# Patient Record
Sex: MALE | Race: WHITE
[De-identification: names, ages, dates, MRNs, and addresses within clinical notes are randomized per-mention and may not be internally consistent; named-entity substitution may affect disease eponyms.]

---

## 2017-02-11 ENCOUNTER — HOSPITAL ENCOUNTER (OUTPATIENT)
Dept: HOSPITAL 45 - C.LAB | Age: 52
Discharge: HOME | End: 2017-02-11
Attending: INTERNAL MEDICINE
Payer: COMMERCIAL

## 2017-02-11 DIAGNOSIS — E78.5: ICD-10-CM

## 2017-02-11 DIAGNOSIS — I10: Primary | ICD-10-CM

## 2017-02-11 DIAGNOSIS — E11.9: ICD-10-CM

## 2017-02-11 LAB
ALBUMIN/GLOB SERPL: 0.9 {RATIO} (ref 0.9–2)
ALP SERPL-CCNC: 61 U/L (ref 45–117)
ALT SERPL-CCNC: 23 U/L (ref 12–78)
ANION GAP SERPL CALC-SCNC: 10 MMOL/L (ref 3–11)
AST SERPL-CCNC: 19 U/L (ref 15–37)
BUN SERPL-MCNC: 16 MG/DL (ref 7–18)
BUN/CREAT SERPL: 14.7 (ref 10–20)
CALCIUM SERPL-MCNC: 9.4 MG/DL (ref 8.5–10.1)
CHLORIDE SERPL-SCNC: 105 MMOL/L (ref 98–107)
CHOLEST/HDLC SERPL: 3.7 {RATIO}
CO2 SERPL-SCNC: 25 MMOL/L (ref 21–32)
CREAT SERPL-MCNC: 1.1 MG/DL (ref 0.6–1.4)
EST. AVERAGE GLUCOSE BLD GHB EST-MCNC: 166 MG/DL
GLOBULIN SER-MCNC: 4.2 GM/DL (ref 2.5–4)
GLUCOSE SERPL-MCNC: 153 MG/DL (ref 70–99)
GLUCOSE UR QL: 53 MG/DL
KETONES UR QL STRIP: 118 MG/DL
NITRITE UR QL STRIP: 134 MG/DL (ref 0–150)
PH UR: 198 MG/DL (ref 0–200)
POTASSIUM SERPL-SCNC: 4.3 MMOL/L (ref 3.5–5.1)
SODIUM SERPL-SCNC: 140 MMOL/L (ref 136–145)
TSH SERPL-ACNC: 1.77 UIU/ML (ref 0.3–4.5)
VERY LOW DENSITY LIPOPROT CALC: 27 MG/DL

## 2017-03-05 ENCOUNTER — HOSPITAL ENCOUNTER (INPATIENT)
Dept: HOSPITAL 45 - C.EDB | Age: 52
LOS: 1 days | Discharge: HOME | DRG: 299 | End: 2017-03-06
Attending: INTERNAL MEDICINE | Admitting: HOSPITALIST
Payer: COMMERCIAL

## 2017-03-05 VITALS
BODY MASS INDEX: 30.43 KG/M2 | WEIGHT: 205.47 LBS | HEIGHT: 69 IN | HEIGHT: 69 IN | WEIGHT: 205.47 LBS | BODY MASS INDEX: 30.43 KG/M2

## 2017-03-05 DIAGNOSIS — E78.5: ICD-10-CM

## 2017-03-05 DIAGNOSIS — I48.91: ICD-10-CM

## 2017-03-05 DIAGNOSIS — I82.441: ICD-10-CM

## 2017-03-05 DIAGNOSIS — N17.9: ICD-10-CM

## 2017-03-05 DIAGNOSIS — I82.431: ICD-10-CM

## 2017-03-05 DIAGNOSIS — I26.99: ICD-10-CM

## 2017-03-05 DIAGNOSIS — I82.411: Primary | ICD-10-CM

## 2017-03-05 DIAGNOSIS — I10: ICD-10-CM

## 2017-03-05 DIAGNOSIS — F32.9: ICD-10-CM

## 2017-03-05 DIAGNOSIS — I82.811: ICD-10-CM

## 2017-03-05 DIAGNOSIS — Z79.01: ICD-10-CM

## 2017-03-05 DIAGNOSIS — Z90.81: ICD-10-CM

## 2017-03-05 DIAGNOSIS — Z94.1: ICD-10-CM

## 2017-03-05 DIAGNOSIS — I82.491: ICD-10-CM

## 2017-03-05 LAB
ALBUMIN/GLOB SERPL: 0.9 {RATIO} (ref 0.9–2)
ALP SERPL-CCNC: 76 U/L (ref 45–117)
ALT SERPL-CCNC: 25 U/L (ref 12–78)
ANION GAP SERPL CALC-SCNC: 12 MMOL/L (ref 3–11)
ANION GAP SERPL CALC-SCNC: 14 MMOL/L (ref 16–25)
AST SERPL-CCNC: 20 U/L (ref 15–37)
BASOPHILS # BLD: 0.04 K/UL (ref 0–0.2)
BASOPHILS NFR BLD: 0.3 %
BUN SERPL-MCNC: 32 MG/DL (ref 7–18)
BUN/CREAT SERPL: 14.3 (ref 10–20)
CA-I BLD-SCNC: 1.22 MMOL/L (ref 1.12–1.32)
CALCIUM SERPL-MCNC: 9.1 MG/DL (ref 8.5–10.1)
CHLORIDE BLD-SCNC: 103 MEQ/L (ref 101–112)
CHLORIDE SERPL-SCNC: 104 MMOL/L (ref 98–107)
CO2 SERPL-SCNC: 25 MMOL/L (ref 21–32)
COMPLETE: YES
CREAT BLD-MCNC: 1.9 MG/DL (ref 0.6–1.3)
CREAT CL PREDICTED SERPL C-G-VRATE: 44.3 ML/MIN
CREAT SERPL-MCNC: 2.2 MG/DL (ref 0.6–1.4)
EOSINOPHIL NFR BLD AUTO: 266 K/UL (ref 130–400)
GLOBULIN SER-MCNC: 4.1 GM/DL (ref 2.5–4)
GLUCOSE SERPL-MCNC: 155 MG/DL (ref 70–99)
HCT VFR BLD AUTO: 49 % (ref 42–52)
HCT VFR BLD CALC: 47.2 % (ref 42–52)
HGB BLD-MCNC: 16.7 G/DL (ref 14–18)
IG%: 0.2 %
IMM GRANULOCYTES NFR BLD AUTO: 35.4 %
INR PPP: 1 (ref 0.9–1.1)
ISTAT CARBON DIOXIDE: 30 MEQ/L (ref 24–31)
LYMPHOCYTES # BLD: 4.6 K/UL (ref 1.2–3.4)
MCH RBC QN AUTO: 31.4 PG (ref 25–34)
MCHC RBC AUTO-ENTMCNC: 33.7 G/DL (ref 32–36)
MCV RBC AUTO: 93.3 FL (ref 80–100)
MONOCYTES NFR BLD: 12.5 %
NEUTROPHILS # BLD AUTO: 3.3 %
NEUTROPHILS NFR BLD AUTO: 48.3 %
NRBC BLD AUTO-RTO: 0.7 %
PARTIAL THROMBOPLASTIN RATIO: 1
PMV BLD AUTO: 10.2 FL (ref 7.4–10.4)
POTASSIUM SERPL-SCNC: 4.1 MMOL/L (ref 3.5–5.1)
PROTHROMBIN TIME: 10.8 SECONDS (ref 9–12)
RBC # BLD AUTO: 5.06 M/UL (ref 4.7–6.1)
SODIUM BLD-SCNC: 142 MEQ/L (ref 135–144)
SODIUM SERPL-SCNC: 141 MMOL/L (ref 136–145)
WBC # BLD AUTO: 12.98 K/UL (ref 4.8–10.8)

## 2017-03-05 NOTE — HISTORY AND PHYSICAL
History & Physical


Date & Time of Service:


Mar 5, 2017 at 23:30


Chief Complaint:


Rt Leg Stiffness W/Pain,Hx Bloodclot


Primary Care Physician:


Kp Contreras M.D.


History of Present Illness


Source:  patient, clinic records, hospital records


Mr Banerjee is a 51 year old male with history of a heart transplant who presents 

with four days of right leg pain and tightness. He was concerned for a DVT as 

it felt like his previous DVT 9 months previously. His last DVT was possibly 

provoked by 8 hour car trips around the country. He was treated with Lovenox 

transitioned to warfarin and reports being well controlled on this. He had a 

follow up DVT scan which showed recanalized veins in December 2016. He reports 

no shortness of breath or chest pain with either this DVT or his last.





Recently he has had no provoking surgeries or long trips. Although he drives 

once/month to [place] (3 hours down in morning and 3 hours back at night 

without stopping). He works as a  and mostly sedentary at 

the desk during the day.





His heart history is documented below in [facility]. He follows up once a year in 

[place] - Dr Frausto. His PCP is Dr Contreras. He reports no previous kidney 

issues. He has been eating and drinking well over the last few days. Denies any 

urinary Sx.





Past Medical/Surgical History


Congenital Heart Disease - Ebstein Anomaly, congenitally corrected 

transposition of the great vessels


Aortic valve replacement - 1979


Bacterial endocarditis - 1984, causing ARDS and requiring ECMO and tracheostomy


Redo aortic valve replacement


Staph septicemia + splenectomy - 1990


Peptic ulcer disease - 1990


Phlebitis of left cephalic vein - venectomy - 1990


Atrial fibrillation and complete heart block - 


Multiple pacemaker revisions


Nasal septal deviation


Sinusitis


Heart transplant - 2004 with complication of acute renal failure requiring 

dialysis and ultrafiltration, post operative ileus. One previous episode of 

rejection treated with oral steroid burst


Hypertension


Hyperlipidemia


Right leg DVT - 9 months previous treated withlovenox then warfarin 


Short term memory loss


Depression





Family History





No significant family history





Social History


Smoking Status:  Never Smoker


Marital Status:  


Occupational Status:  employed





Multi-Drug Resistant Organisms


History of MDRO:  No





Allergies


Coded Allergies:  


     Heparin (Verified  Allergy, Unknown, 1/16/15)


     Pork (Verified  Allergy, Unknown, 3/5/17)


     ACE Inhibitors (Unverified  Adverse Reaction, Unknown, HIVES, 3/5/17)


 Caused Cough


     Amoxicillin (Unverified  Adverse Reaction, Unknown, RASH, 3/5/17)


 caused nausea


     Clavulanic Acid (Unverified  Adverse Reaction, Unknown, RASH, 3/5/17)


 caused nausea


     Quinidine (Unverified  Adverse Reaction, Unknown, RASH, 3/5/17)


 Caused Nausea





Home Medications


Scheduled


Alfuzosin Hcl (Uroxatral), 10 MG PO DAILY


Aspirin (Aspirin Ec), 81 MG PO DAILY


Bupropion (Wellbutrin-Xl), 300 MG PO DAILY


Buspirone Hcl (Buspar), 15 MG PO BID


Calcium Carbonate-Cholecalcife (Calcium/Vitamin D 500-200 mg-Unit), 1 TAB PO 

DAILY


Esomeprazole Magnesium (Nexium), 40 MG PO DAILY


Losartan Potassium (Cozaar), 100 MG PO DAILY


Methylphenidate Hcl (Concerta), 54 MG PO QAM


Multivitamin (Multivitamin), 1 TAB PO DAILY


Mycophenolate Mofetil (Mycophenolate Mofetil), 750 MG PO BID


Simvastatin (Zocor), 20 MG PO QPM


Tacrolimus (Prograf), 1 MG PO BID





Scheduled PRN


Alprazolam (Xanax), 1 TAB PO DAILY PRN for Sleep





Review of Systems


Constitutional:  No chills, No fever, No sweats, No weight loss


Eyes:  No worsening of vision


ENT:  No hearing loss, No nasal symptoms, No unusual epistaxis


Respiratory:  No cough, No dyspnea at rest, No dyspnea on exertion, No 

hemoptysis, No shortness of breath, No sputum, No wheezing


Cardiovascular:  No PND, No chest pain, No claudication, No edema, No orthopnea

, No palpitations


Abdomen:  No GI bleeding, No constipation, No diarrhea, No nausea, No pain, No 

vomiting


Musculoskeletal:  No joint pain


Genitourinary - Male:  No dysuria, No hematuria, No urinary frequency


Psychiatric:  No anxiety, No depression symptoms


Endocrine:  No excessive thirst, No excessive urination, No fatigue


Hematologic / Lymphatic:  No abnormal bleeding/bruising


Integumentary:  No itch, No rash





Physical Exam


Vital Signs











  Date Time  Temp Pulse Resp B/P Pulse Ox O2 Delivery O2 Flow Rate FiO2


 


3/5/17 23:29  94      


 


3/5/17 22:45  83 18 206/128 95 Room Air  


 


3/5/17 22:08  97 16 151/104 95   


 


3/5/17 22:07     98 Room Air  


 


3/5/17 20:05 36.6 104 20 131/95 96 Room Air  








General Appearance:  WD/WN, no apparent distress


Head:  normocephalic, atraumatic


Eyes:  normal inspection, PERRL, EOMI


ENT:  normal ENT inspection


Neck:  supple, no JVD


Respiratory/Chest:  chest non-tender, lungs clear, normal breath sounds, no 

respiratory distress, no accessory muscle use


Cardiovascular:  regular rate, rhythm, normal peripheral pulses, + systolic 

murmur (soft LUSB)


Abdomen/GI:  normal bowel sounds, non tender, soft


Back:  no CVA tenderness


Extremities/Musculoskelatal:  no pedal edema, + pertinent finding (tender 

swollen right calf)


Neurologic/Psych:  CNs II-XII nml as tested, no motor/sensory deficits, alert, 

oriented x 3


Skin:  normal color, warm/dry, no rash





Diagnostics


Laboratory Results





Results Past 24 Hours








Test


  3/5/17


21:58 3/5/17


22:03 Range/Units


 


 


White Blood Count 12.98  4.8-10.8  K/uL


 


Red Blood Count 5.06  4.7-6.1  M/uL


 


Hemoglobin 15.9  14.0-18.0  g/dL


 


Hematocrit 47.2  42-52  %


 


Mean Corpuscular Volume 93.3    fL


 


Mean Corpuscular Hemoglobin 31.4  25-34  pg


 


Mean Corpuscular Hemoglobin


Concent 33.7


  


  32-36  g/dl


 


 


Platelet Count 266  130-400  K/uL


 


Mean Platelet Volume 10.2  7.4-10.4  fL


 


Neutrophils (%) (Auto) 48.3   %


 


Lymphocytes (%) (Auto) 35.4   %


 


Monocytes (%) (Auto) 12.5   %


 


Eosinophils (%) (Auto) 3.3   %


 


Basophils (%) (Auto) 0.3   %


 


Neutrophils # (Auto) 6.26  1.4-6.5  K/uL


 


Lymphocytes # (Auto) 4.60  1.2-3.4  K/uL


 


Monocytes # (Auto) 1.62  0.11-0.59  K/uL


 


Eosinophils # (Auto) 0.43  0-0.5  K/uL


 


Basophils # (Auto) 0.04  0-0.2  K/uL


 


RDW Standard Deviation 44.2  36.4-46.3  fL


 


RDW Coefficient of Variation 13.0  11.5-14.5  %


 


Immature Granulocyte % (Auto) 0.2   %


 


Immature Granulocyte # (Auto) 0.03  0.00-0.02  K/uL


 


Nucleated RBC Absolute Count


(auto) 0.10


  


  0-0  K/uL


 


 


Nucleated Red Blood Cells % 0.7   %


 


Prothrombin Time


  10.8


  


  9.0-12.0


SECONDS


 


Prothromb Time International


Ratio 1.0


  


  0.9-1.1  


 


 


Activated Partial


Thromboplast Time 26.6


  


  21.0-31.0


SECONDS


 


Partial Thromboplastin Ratio 1.0   


 


Sodium Level 141  136-145  mmol/L


 


Potassium Level 4.1  3.5-5.1  mmol/L


 


Chloride Level 104    mmol/L


 


Carbon Dioxide Level 25  21-32  mmol/L


 


Anion Gap 12.0 14.0 16-25  mmol/L


 


Blood Urea Nitrogen 32  7-18  mg/dl


 


Creatinine


  2.20


  


  0.60-1.40


mg/dl


 


Est Creatinine Clear Calc


Drug Dose 44.3


  


   ml/min


 


 


Estimated GFR (


American) 38.5


  


   


 


 


Estimated GFR (Non-


American 33.2


  


   


 


 


BUN/Creatinine Ratio 14.3  10-20  


 


Random Glucose 155  70-99  mg/dl


 


Calcium Level 9.1  8.5-10.1  mg/dl


 


Total Bilirubin 0.7  0.2-1  mg/dl


 


Aspartate Amino Transf


(AST/SGOT) 20


  


  15-37  U/L


 


 


Alanine Aminotransferase


(ALT/SGPT) 25


  


  12-78  U/L


 


 


Alkaline Phosphatase 76    U/L


 


Total Protein 7.8  6.4-8.2  gm/dl


 


Albumin 3.7  3.4-5.0  gm/dl


 


Globulin 4.1  2.5-4.0  gm/dl


 


Albumin/Globulin Ratio 0.9  0.9-2  


 


Chemistry Specimen Hemolysis    


 


Bedside Hemoglobin  16.7 14.0-18.0  g/dl


 


Bedside Hematocrit  49 42-52  %


 


Bedside Sodium  142 135-144  mEq/L


 


Bedside Potassium  4.1 3.3-5.0  mEq/L


 


Bedside Chloride  103 101-112  mEq/L


 


Bedside Total CO2  30 24-31  mEq/l


 


Bedside Blood Urea Nitrogen  33 7-18  mg/dl


 


Bedside Creatinine  1.9 0.6-1.3  mg/dl


 


Bedside Glucose (other)  158 70-99  mg/dl


 


Bedside Ionized Calcium (Natalie)


  


  1.22


  1.12-1.32


mmol/l











Diagnostic Radiology


CHEST CTA for PULMONARY ARTERIES





CT DOSE: 523.15 mGy.cm





HISTORY: Chest pain  dyspnea





TECHNIQUE: Multiaxial CT images of the chest were performed following the


intravenous administration of contrast to evaluate the pulmonary arteries.


Maximal intensity projection images were also obtained.





COMPARISON STUDY: None.





FINDINGS: Atherosclerotic change thoracic aorta.





Median sternotomy.





There is a filling defect of the third order vessel right lower lobe


distribution transaxial image 1 hour 9. There is no evidence for main or central


pulmonary embolus. All remaining pulmonary arterial vessels fill appropriately.


There is slight interstitial and interstitial prominence bilaterally. There are


no well-defined focal infiltrates.





IMPRESSION: 





1. Small third order filling defect or pulmonary embolus right lower lobe


2. No evidence for main or central pulmonary embolus.


3. Slight interstitial prominence throughout both hemithoraces 





Electronically signed by:  Arthur Gamboa M.D.


3/5/2017 10:38 PM





Dictated Date/Time:  3/5/2017 10:34 PM











Venous Doppler right leg RIGHT VENOUS DOPP LOWER EXT UNILAT





CLINICAL HISTORY: Pt c/o RLE swelling


Right pain. Edema.





TECHNIQUE: Venous Doppler





COMPARISON STUDY:  None





FINDINGS: Extensive acute deep venous thrombosis throughout the right leg.


Thrombus is identified within the common as well as superficial femoral veins.


There is also involvement of the popliteal vein and distal vessels including


posterior tibial peroneal and anterior tibial veins. Thrombus formation is also


identified within several superficial venous structures the calf





IMPRESSION:  extensive acute deep venous thrombosis throughout the right leg. 





Electronically signed by:  Arthur Gamboa M.D.


3/5/2017 9:36 PM





Dictated Date/Time:  3/5/2017 9:34 PM





Impression


Assessment and Plan


50 yo male heart transplant patient with 2nd DVT and acute kidney injury.





CHICHI - Cr 2.2, 1.1 @ baseline


- ER discussed with Dr Chi (Nephrology), for gentle hydration 


- IVF  MLS/HR, no sign of heart failure on CT


- given contrast for CTPA therefore may get worse overnight


- add CK to labs as possible explanation given DVT


- tacrolimus could also be the cause and will keep [facility] transplant team 

informed of admission. Level taken.





DVT / possible small PE


- will treat with Lovenox. Reduced dose to daily 90mg (1mg/kg) due to renal 

function, however if improving by the morning we will increase this to Q12H.


- further treatment pending clinical course but given INR reportedly stable 

previously this remains a good option for long term anticoagulation





Heart transplant


- continue mycophenolate and tacrolimus - patient to take own medications. 

Tacrolimus levels taken.


- Contact [place] transplant team to inform of admission ()





HTN


- Continue losartan 100 mg daily





Depression/anxiety


- Continue Wellbutrin + BuSpar. Alprazolam PRN





Code


- Full





VTE Prophylaxis


- On treatment dose Lovenox for DVT/PE as above





Disposition


- will admit to telemetry given multiple medical issues with heart transplant, 

acute kidney injury and DVT/PE.











Resident Physician Supervision Note:


Pt examined independently. I discussed the case with the resident and agree 

with the findings and plan as documented in the note.  Any exceptions or 

clarifications are listed here.





52 y/o M w/Hx heart transplant 13 yrs prior - treated for DVT for 6 month 

period 8 months ago - presents with LE pain and swelling - has extensive DVT 

and small PE.  Also noted to have renal impairment which was not previously 

present.





O/E 


AAO x 3 


S1,2 R


CTAB


NT, ND





P:


DVT - will likely need to commit to lifelong anticoagulation - he received a 

dose of Lovenox but may need Heparin if renal function worsens and will need to 

be transitioned to Coumadin.  Would defer to [facility] transplant center to 

determine if he would be eligible for alternative anticoagulation.





CHICHI - recently normal creat - will obtain urine sodiium and creat - provide IVF 

and check BMP - pt does not have any reason to suspect he is dehydrated - 

denies decreased intake - it is also noted that he received contrast in the ER 

which will hopefully be offset by IVF





Transplant - Cont Mycophenolate and Tacro - will check levels due to his renal 

impairment

















Documented By:  Prashant Cespedes





Level of Care


Telemetry





Resuscitation Status


FULL RESUSCITATION





VTE Prophylaxis


VTE Risk Assessment Done? Y/N:  Yes


Risk Level:  High


Given or contraindicated:  Enoxaparin (Lovenox)SQ (treatment dose)





Additional Copies To


Len Smith M.D.; Kp Contreras M.D.

## 2017-03-05 NOTE — DIAGNOSTIC IMAGING REPORT
Venous Doppler right leg RIGHT VENOUS DOPP LOWER EXT UNILAT



CLINICAL HISTORY: Pt c/o RLE swelling

Right pain. Edema.



TECHNIQUE: Venous Doppler



COMPARISON STUDY:  None



FINDINGS: Extensive acute deep venous thrombosis throughout the right leg.

Thrombus is identified within the common as well as superficial femoral veins.

There is also involvement of the popliteal vein and distal vessels including

posterior tibial peroneal and anterior tibial veins. Thrombus formation is also

identified within several superficial venous structures the calf



IMPRESSION:  extensive acute deep venous thrombosis throughout the right leg. 









Electronically signed by:  Arthur Gamboa M.D.

3/5/2017 9:36 PM



Dictated Date/Time:  3/5/2017 9:34 PM

## 2017-03-05 NOTE — DIAGNOSTIC IMAGING REPORT
CHEST CTA for PULMONARY ARTERIES



CT DOSE: 523.15 mGy.cm



HISTORY: Chest pain  dyspnea



TECHNIQUE: Multiaxial CT images of the chest were performed following the

intravenous administration of contrast to evaluate the pulmonary arteries.

Maximal intensity projection images were also obtained.



COMPARISON STUDY: None.



FINDINGS: Atherosclerotic change thoracic aorta.



Median sternotomy.



There is a filling defect of the third order vessel right lower lobe

distribution transaxial image 1 hour 9. There is no evidence for main or central

pulmonary embolus. All remaining pulmonary arterial vessels fill appropriately.

There is slight interstitial and interstitial prominence bilaterally. There are

no well-defined focal infiltrates.



IMPRESSION: 



1. Small third order filling defect or pulmonary embolus right lower lobe

2. No evidence for main or central pulmonary embolus.

3. Slight interstitial prominence throughout both hemithoraces 







Electronically signed by:  Arthur Gamboa M.D.

3/5/2017 10:38 PM



Dictated Date/Time:  3/5/2017 10:34 PM

## 2017-03-05 NOTE — EMERGENCY ROOM VISIT NOTE
History


Report prepared by Val:  Jocy Vallecillo


Under the Supervision of:  Dr. Oscar Cuellar M.D.


First contact with patient:  20:16


Chief Complaint:  LEG PAIN,LEG INJURY


Stated Complaint:  RT LEG STIFFNESS W/PAIN,HX BLOODCLOT





History of Present Illness


The patient is a 51 year old male who presents to the Emergency Room with 

complaints of worsening right calf pain for the past week. He states that his 

pain started subtly and initially felt like he strained a muscle. Over the past 

week his pain has worsened and he describes a tight feeling in the right calf. 

The patient rates his current pain as a 3/10. He has a history of DVT and had 

one 9 months ago. He was on Lovenox and Warfarin for his previous DVT, but he 

is no longer taking any blood thinners. He states that his current symptoms 

feel exactly like his previous DVT. He recently drove to [place], but denies 

any other long trips.





   Source of History:  patient


   Onset:  1 week ago


   Position:  leg (right)


   Symptom Intensity:  3/10


   Quality:  other (tightness)


   Timing:  worsening





Review of Systems


See HPI for pertinent positives & negatives. A total of 10 systems reviewed and 

were otherwise negative.





Past Medical & Surgical


Surgical Problems:


(1) Heart transplanted








Family History





No significant family history





Social History


Smoking Status:  Never Smoker


Marital Status:  


Housing Status:  lives with family


Occupation Status:  employed





Current/Historical Medications


Scheduled


Alfuzosin Hcl (Uroxatral), 10 MG PO DAILY


Aspirin (Aspirin Ec), 81 MG PO DAILY


Bupropion (Wellbutrin-Xl), 300 MG PO DAILY


Buspirone Hcl (Buspar), 15 MG PO BID


Calcium Carbonate-Cholecalcife (Calcium/Vitamin D 500-200 mg-Unit), 1 TAB PO 

DAILY


Esomeprazole Magnesium (Nexium), 40 MG PO DAILY


Losartan Potassium (Cozaar), 100 MG PO DAILY


Methylphenidate Hcl (Concerta), 54 MG PO QAM


Multivitamin (Multivitamin), 1 TAB PO DAILY


Mycophenolate Mofetil (Mycophenolate Mofetil), 750 MG PO BID


Simvastatin (Zocor), 20 MG PO QPM


Tacrolimus (Prograf), 1 MG PO BID





Scheduled PRN


Alprazolam (Xanax), 1 TAB PO DAILY PRN for Sleep





Allergies


Coded Allergies:  


     Heparin (Verified  Allergy, Unknown, 1/16/15)


     Pork (Verified  Allergy, Unknown, 3/5/17)


     ACE Inhibitors (Unverified  Adverse Reaction, Unknown, HIVES, 3/5/17)


 Caused Cough


     Amoxicillin (Unverified  Adverse Reaction, Unknown, RASH, 3/5/17)


 caused nausea


     Clavulanic Acid (Unverified  Adverse Reaction, Unknown, RASH, 3/5/17)


 caused nausea


     Quinidine (Unverified  Adverse Reaction, Unknown, RASH, 3/5/17)


 Caused Nausea





Physical Exam


Vital Signs











  Date Time  Temp Pulse Resp B/P Pulse Ox O2 Delivery O2 Flow Rate FiO2


 


3/5/17 23:29  94      


 


3/5/17 22:45  83 18 206/128 95 Room Air  


 


3/5/17 22:08  97 16 151/104 95   


 


3/5/17 22:07     98 Room Air  


 


3/5/17 20:05 36.6 104 20 131/95 96 Room Air  











Physical Exam


GENERAL: Patient is a healthy-appearing well-nourished 51 year old male.


HEAD: Normocephalic atraumatic


EYES: Ocular movements intact pupils equal and react to light


OROPHARYNX mucous membranes are moist no exudates present no erythema or edema 

present


NECK: Supple no nuchal rigidity


CHEST: Good equal expansion


LUNGS: Clear and equal to auscultation


CARDIAC: Normal S1 and S2


ABDOMEN: Soft nontender no guarding


BACK: No CVA tenderness


EXTREMITIES: No pain upon palpation normal muscle strength in all groups no 

clubbing cyanosis or edema. Right calf is slightly tender, no evidence of 

cellulitis, good ROM of the ankle knee and hip. 


NEURO: Patient is following commands is answering questions appropriately. 

Alert and oriented x3 Cranial Nerves 2-12 grossly intact





Medical Decision & Procedures


ER Provider


Diagnostic Interpretation:


Radiology results as stated below per my review and radiologist interpretation:








Venous Doppler right leg RIGHT VENOUS DOPP LOWER EXT UNILAT





CLINICAL HISTORY: Pt c/o RLE swelling


Right pain. Edema.





TECHNIQUE: Venous Doppler





COMPARISON STUDY:  None





FINDINGS: Extensive acute deep venous thrombosis throughout the right leg.


Thrombus is identified within the common as well as superficial femoral veins.


There is also involvement of the popliteal vein and distal vessels including


posterior tibial peroneal and anterior tibial veins. Thrombus formation is also


identified within several superficial venous structures the calf





IMPRESSION:  extensive acute deep venous thrombosis throughout the right leg. 














Electronically signed by:  Arthur Gmaboa M.D.


3/5/2017 9:36 PM





Dictated Date/Time:  3/5/2017 9:34 PM














CHEST CTA for PULMONARY ARTERIES





CT DOSE: 523.15 mGy.cm





HISTORY: Chest pain  dyspnea





TECHNIQUE: Multiaxial CT images of the chest were performed following the


intravenous administration of contrast to evaluate the pulmonary arteries.


Maximal intensity projection images were also obtained.





COMPARISON STUDY: None.





FINDINGS: Atherosclerotic change thoracic aorta.





Median sternotomy.





There is a filling defect of the third order vessel right lower lobe


distribution transaxial image 1 hour 9. There is no evidence for main or central


pulmonary embolus. All remaining pulmonary arterial vessels fill appropriately.


There is slight interstitial and interstitial prominence bilaterally. There are


no well-defined focal infiltrates.





IMPRESSION: 





1. Small third order filling defect or pulmonary embolus right lower lobe


2. No evidence for main or central pulmonary embolus.


3. Slight interstitial prominence throughout both hemithoraces 











Electronically signed by:  Arthur Gamboa M.D.


3/5/2017 10:38 PM





Dictated Date/Time:  3/5/2017 10:34 PM





Laboratory Results


3/5/17 21:58








Red Blood Count 5.06, Mean Corpuscular Volume 93.3, Mean Corpuscular Hemoglobin 

31.4, Mean Corpuscular Hemoglobin Concent 33.7, Mean Platelet Volume 10.2, 

Neutrophils (%) (Auto) 48.3, Lymphocytes (%) (Auto) 35.4, Monocytes (%) (Auto) 

12.5, Eosinophils (%) (Auto) 3.3, Basophils (%) (Auto) 0.3, Neutrophils # (Auto

) 6.26, Lymphocytes # (Auto) 4.60, Monocytes # (Auto) 1.62, Eosinophils # (Auto

) 0.43, Basophils # (Auto) 0.04





3/5/17 21:58

















Test


  3/5/17


21:58 3/5/17


22:03


 


White Blood Count


  12.98 K/uL


(4.8-10.8) 


 


 


Red Blood Count


  5.06 M/uL


(4.7-6.1) 


 


 


Hemoglobin


  15.9 g/dL


(14.0-18.0) 


 


 


Hematocrit 47.2 % (42-52)  


 


Mean Corpuscular Volume


  93.3 fL


() 


 


 


Mean Corpuscular Hemoglobin


  31.4 pg


(25-34) 


 


 


Mean Corpuscular Hemoglobin


Concent 33.7 g/dl


(32-36) 


 


 


Platelet Count


  266 K/uL


(130-400) 


 


 


Mean Platelet Volume


  10.2 fL


(7.4-10.4) 


 


 


Neutrophils (%) (Auto) 48.3 %  


 


Lymphocytes (%) (Auto) 35.4 %  


 


Monocytes (%) (Auto) 12.5 %  


 


Eosinophils (%) (Auto) 3.3 %  


 


Basophils (%) (Auto) 0.3 %  


 


Neutrophils # (Auto)


  6.26 K/uL


(1.4-6.5) 


 


 


Lymphocytes # (Auto)


  4.60 K/uL


(1.2-3.4) 


 


 


Monocytes # (Auto)


  1.62 K/uL


(0.11-0.59) 


 


 


Eosinophils # (Auto)


  0.43 K/uL


(0-0.5) 


 


 


Basophils # (Auto)


  0.04 K/uL


(0-0.2) 


 


 


RDW Standard Deviation


  44.2 fL


(36.4-46.3) 


 


 


RDW Coefficient of Variation


  13.0 %


(11.5-14.5) 


 


 


Immature Granulocyte % (Auto) 0.2 %  


 


Immature Granulocyte # (Auto)


  0.03 K/uL


(0.00-0.02) 


 


 


Nucleated RBC Absolute Count


(auto) 0.10 K/uL


(0-0) 


 


 


Nucleated Red Blood Cells % 0.7 %  


 


Prothrombin Time


  10.8 SECONDS


(9.0-12.0) 


 


 


Prothromb Time International


Ratio 1.0 (0.9-1.1) 


  


 


 


Activated Partial


Thromboplast Time 26.6 SECONDS


(21.0-31.0) 


 


 


Partial Thromboplastin Ratio 1.0  


 


Est Creatinine Clear Calc


Drug Dose 44.3 ml/min 


  


 


 


Estimated GFR (


American) 38.5 


  


 


 


Estimated GFR (Non-


American 33.2 


  


 


 


BUN/Creatinine Ratio 14.3 (10-20)  


 


Calcium Level


  9.1 mg/dl


(8.5-10.1) 


 


 


Total Bilirubin


  0.7 mg/dl


(0.2-1) 


 


 


Aspartate Amino Transf


(AST/SGOT) 20 U/L (15-37) 


  


 


 


Alanine Aminotransferase


(ALT/SGPT) 25 U/L (12-78) 


  


 


 


Alkaline Phosphatase


  76 U/L


() 


 


 


Total Protein


  7.8 gm/dl


(6.4-8.2) 


 


 


Albumin


  3.7 gm/dl


(3.4-5.0) 


 


 


Globulin


  4.1 gm/dl


(2.5-4.0) 


 


 


Albumin/Globulin Ratio 0.9 (0.9-2)  


 


Chemistry Specimen Hemolysis   


 


Bedside Hemoglobin


  


  16.7 g/dl


(14.0-18.0)


 


Bedside Hematocrit  49 % (42-52) 


 


Bedside Sodium


  


  142 mEq/L


(135-144)


 


Bedside Potassium


  


  4.1 mEq/L


(3.3-5.0)


 


Bedside Chloride


  


  103 mEq/L


(101-112)


 


Bedside Total CO2


  


  30 mEq/l


(24-31)


 


Anion Gap


  


  14.0 mmol/L


(16-25)


 


Bedside Blood Urea Nitrogen


  


  33 mg/dl


(7-18)


 


Bedside Creatinine


  


  1.9 mg/dl


(0.6-1.3)


 


Bedside Glucose (other)


  


  158 mg/dl


(70-99)


 


Bedside Ionized Calcium (Natalie)


  


  1.22 mmol/l


(1.12-1.32)





Labs reviewed by ED physician.





Medications Administered











 Medications


  (Trade)  Dose


 Ordered  Sig/Kitty


 Route  Start Time


 Stop Time Status Last Admin


Dose Admin


 


 Sodium Chloride


  (Nss 500ml)  500 ml @ 


 999 mls/hr  Q31M STAT


 IV  3/5/17 22:12


 3/5/17 22:42 DC 3/5/17 22:39


999 MLS/HR











ED Course


2016: Past medical records reviewed. The patient was evaluated in room B2. A 

complete history and physical examination was performed. 





2143: I reassessed the patient at this time. I updated him on the results of 

his US and he will be going to get a CT of his chest. 





2212:  ml @ 999 mls/hr IV 





2250: I reassessed the patient at this time. He is resting comfortably. I 

discussed the results and treatment plan with the patient. I answered all 

pertaining questions that he had. He expressed understanding and verbalized 

agreement. 





2304: I spoke with Dr. Cespedes. We discussed the patients results and treatment 

plan. He recommended consulting with nephrology. The patient will be evaluated 

by the [facility] for further management. 





2312:  ml @ 999 mls/hr IV 





2317: At this time I spoke with Dr. Chi of nephrology. We discussed the 

patient's case. He recommended gentle hydration. He states that patient does 

not need dialysis.





Medical Decision


Differential diagnosis:


Etiologies such as DVT, musculoskeletal, infection, joint effusion, trauma, 

lymphedema, idiopathic, CHF, as well as others were entertained.





This is a 51-year-old male with a history of a heart transplant who presents 

emergency department complaining of right lower extremity swelling.  The 

patient has a history of a DVT in the past and had been admitted to [facility] for.  He is tachycardic however he has had a heart transplant.  Based 

on the patient's complaints he was also sent for a CAT scan of the chest which 

was concerning for single PE.  In addition his creatinine is also elevated.  I 

did discuss the case with the hospitalist service.  A hypercoagulability panel 

was drawn.  Patient was given a liter bolus of fluid for his acute renal 

failure.  In addition he was also started on Lovenox.





Consults


Time Called:  2251


Consulting Physician:  Dr. Cespedes


Returned Call:  2304


I spoke with Dr. Cespedes. We discussed the patients results and treatment plan. 

He recommended consulting with nephrology. The patient will be evaluated by the 

[facility] for further management.


Additional Consults:  


   Time Called:  2314


   Consulted Physician:  Dr. Chi


   Returned Call:  2310


Additional Comments:


At this time I spoke with Dr. Chi of nephrology. We discussed the patient's 

case. He recommended gentle hydration. He states that patient does not need 

dialysis.





Impression





 Primary Impression:  


 Deep vein thrombosis


 Additional Impressions:  


 Pulmonary embolism


 Renal failure





Critical Care


I have personally spent greater than 30 minutes of critical care time in the 

direct management of this patient.  This includes bedside care, interpretation 

of diagnostic studies, and testing, discussion with consultants, patient, and 

family members, and other required patient management activities.  This 30 

minutes is in excess of all separately billable procedures.





Scribe Attestation


The scribe's documentation has been prepared under my direction and personally 

reviewed by me in its entirety. I confirm that the note above accurately 

reflects all work, treatment, procedures, and medical decision making performed 

by me.





Departure Information


Dispostion


Being Evaluated By Hospitalist





Referrals


Kp Contreras M.D. (PCP)





Patient Instructions


My [facility]





Problem Qualifiers








 Primary Impression:  


 Deep vein thrombosis


 DVT location:  lower extremity  Affected thrombotic vein of extremity:  

unspecified vein of extremity  Laterality:  right  Chronicity:  acute  

Qualified Codes:  I82.401 - Acute embolism and thrombosis of unspecified deep 

veins of right lower extremity


 Additional Impressions:  


 Pulmonary embolism


 Pulmonary embolism type:  other  Chronicity:  acute  Acute cor pulmonale 

presence:  without acute cor pulmonale  Qualified Codes:  I26.99 - Other 

pulmonary embolism without acute cor pulmonale

## 2017-03-06 VITALS
TEMPERATURE: 97.88 F | SYSTOLIC BLOOD PRESSURE: 142 MMHG | HEART RATE: 89 BPM | DIASTOLIC BLOOD PRESSURE: 100 MMHG | OXYGEN SATURATION: 94 %

## 2017-03-06 VITALS
DIASTOLIC BLOOD PRESSURE: 93 MMHG | HEART RATE: 89 BPM | SYSTOLIC BLOOD PRESSURE: 141 MMHG | OXYGEN SATURATION: 98 % | TEMPERATURE: 97.88 F

## 2017-03-06 VITALS
DIASTOLIC BLOOD PRESSURE: 101 MMHG | TEMPERATURE: 97.7 F | HEART RATE: 93 BPM | SYSTOLIC BLOOD PRESSURE: 150 MMHG | OXYGEN SATURATION: 95 %

## 2017-03-06 VITALS
DIASTOLIC BLOOD PRESSURE: 93 MMHG | TEMPERATURE: 97.88 F | SYSTOLIC BLOOD PRESSURE: 141 MMHG | OXYGEN SATURATION: 98 % | HEART RATE: 89 BPM

## 2017-03-06 VITALS
TEMPERATURE: 98.06 F | OXYGEN SATURATION: 96 % | DIASTOLIC BLOOD PRESSURE: 94 MMHG | SYSTOLIC BLOOD PRESSURE: 138 MMHG | HEART RATE: 88 BPM

## 2017-03-06 LAB
ANION GAP SERPL CALC-SCNC: 10 MMOL/L (ref 3–11)
APPEARANCE UR: CLEAR
BASOPHILS # BLD: 0.04 K/UL (ref 0–0.2)
BASOPHILS NFR BLD: 0.3 %
BILIRUB UR-MCNC: (no result) MG/DL
BUN SERPL-MCNC: 30 MG/DL (ref 7–18)
BUN/CREAT SERPL: 17.7 (ref 10–20)
CALCIUM SERPL-MCNC: 9.1 MG/DL (ref 8.5–10.1)
CHLORIDE SERPL-SCNC: 105 MMOL/L (ref 98–107)
CO2 SERPL-SCNC: 26 MMOL/L (ref 21–32)
COLOR UR: YELLOW
COMPLETE: YES
CREAT CL PREDICTED SERPL C-G-VRATE: 57.3 ML/MIN
CREAT SERPL-MCNC: 1.7 MG/DL (ref 0.6–1.4)
CREAT UR-MCNC: 56 MG/DL
EOSINOPHIL NFR BLD AUTO: 251 K/UL (ref 130–400)
GLUCOSE SERPL-MCNC: 174 MG/DL (ref 70–99)
HCT VFR BLD CALC: 45.4 % (ref 42–52)
IG%: 0.2 %
IMM GRANULOCYTES NFR BLD AUTO: 33.3 %
LYMPHOCYTES # BLD: 4.21 K/UL (ref 1.2–3.4)
MAGNESIUM SERPL-MCNC: 1.7 MG/DL (ref 1.8–2.4)
MAGNESIUM SERPL-MCNC: 1.9 MG/DL (ref 1.8–2.4)
MANUAL MICROSCOPIC REQUIRED?: NO
MCH RBC QN AUTO: 32.3 PG (ref 25–34)
MCHC RBC AUTO-ENTMCNC: 33.9 G/DL (ref 32–36)
MCV RBC AUTO: 95.2 FL (ref 80–100)
MONOCYTES NFR BLD: 12.4 %
NEUTROPHILS # BLD AUTO: 3.5 %
NEUTROPHILS NFR BLD AUTO: 50.3 %
NITRITE UR QL STRIP: (no result)
PH UR STRIP: 5.5 [PH] (ref 4.5–7.5)
PMV BLD AUTO: 10.6 FL (ref 7.4–10.4)
POTASSIUM SERPL-SCNC: 3.9 MMOL/L (ref 3.5–5.1)
RBC # BLD AUTO: 4.77 M/UL (ref 4.7–6.1)
REVIEW REQ?: NO
SODIUM SERPL-SCNC: 141 MMOL/L (ref 136–145)
SP GR UR STRIP: 1.02 (ref 1–1.03)
URINE EPITHELIAL CELL AUTO: (no result) /LPF (ref 0–5)
UROBILINOGEN UR-MCNC: (no result) MG/DL
WBC # BLD AUTO: 12.66 K/UL (ref 4.8–10.8)
ZZUR CULT IF INDIC CLEAN CATCH: NO

## 2017-03-06 RX ADMIN — SODIUM CHLORIDE SCH MLS/HR: 900 INJECTION, SOLUTION INTRAVENOUS at 03:16

## 2017-03-06 RX ADMIN — SODIUM CHLORIDE SCH MLS/HR: 900 INJECTION, SOLUTION INTRAVENOUS at 11:01

## 2017-03-06 NOTE — CARDIOLOGY CONSULTATION REPORT
DATE OF CONSULTATION:  2017

 

REASON FOR CONSULTATION:

1.  DVT/PE.

2.  History of congenital heart disease, status post cardiac transplant in

.

 

HISTORY OF PRESENT ILLNESS:  Mr. Banerjee is a very pleasant 51-year-old white

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

vessels and Ebstein anomaly), status post surgical correction of

transposition shortly after birth.  At the age of 14, patient underwent an

aortic valve replacement with a Guerra-Osorio valve.  At age 18, he developed

endocarditis which required ECMO for ARDS, splenectomy and redo aortic valve

placement.  He also has a history of paroxysmal atrial fibrillation/atrial

flutter, and symptomatic bradycardia status post multiple cardiac pacemakers.

 With the development of endocarditis, his pacemaker was removed.  Throughout

his 20s and 30s, he did not have a normal LV systolic function and ultimately

his LV systolic continued to decline.  He subsequently developed refractory

congestive heart failure despite optimized medical treatment.  He

subsequently underwent cardiac transplant in  and continues to follow up

with the transplant team at [facility].

 

The patient presented acutely to [facility] Emergency Room

on 2017, complaining of right leg pain and swelling over the preceding

3-4 days.  He does have a history of prior DVT which was considered provoked

9 months ago (this was considered provoked following a long trip).  However,

he now has a recurrent right lower extremity DVT and a small PE, which will

likely necessitate long-term, lifetime anticoagulation.

 

The patient offers no complaints at this time.  He denies any chest pain,

heaviness, tightness, pressure, or discomfort.  He denies any exertional

neck, jaw, back or arm pain.  He has not had any shortness of breath, dyspnea

on exertion, decreased exertional tolerance, cough, hemoptysis, or pleuritic

chest pain.  He further denies any orthopnea or PND.  No palpitations,

syncope, or near syncope.

 

Thus far, he has received Lovenox and consideration is given for long-term

anticoagulation with either Coumadin or one of the novel anticoagulants with

respect to his renal function.  The patient offers no other complaints.  No

other concerns.

 

MEDICATIONS:

1.  Simvastatin 40 mg at bedtime.

2.  Warfarin 5 mg daily.

3.  Lovenox 90 mg subcutaneous injection q. 12 hours.

4.  Alfuzosin 10 mg q. day.

5.  Aspirin 81 mg a day.

6.  Wellbutrin  mg daily.

7.  BuSpar 15 mg b.i.d.

8.  Cozaar 100 mg daily.

9.  Multivitamin daily.

10.  Tacrolimus 1 mg b.i.d.

11.  Calcium with vitamin D daily.

12.  Methylphenidate 54 mg each morning.

13.  Mycophenolate mofetil 750 mg b.i.d.

14.  Protonix 40 mg daily.

15.  Normal saline at 150 mL per hour.

16.  Alprazolam 0.25 mg q. day p.r.n. for sleep.

 

ALLERGIES:

1.  ACE INHIBITORS.

2.  AMOXICILLIN.

3.  CLAVULANIC ACID.

4.  HEPARIN.

5.  PORK.

6.  QUINIDINE.

 

PAST MEDICAL HISTORY:

1.  Congenital heart disease including Ebstein anomaly and transposition of

the great vessels.

2.  Status post surgical correction, shortly after birth, of transposition.

3.  History of aortic valve replacement in .

4.  History of bacterial endocarditis in , complicated by ARDS and

requiring ECMO and tracheostomy.

5.  Status post redo aortic valve replacement.

6.  History of staph septicemia and splenectomy in .

7.  Peptic ulcer disease in .

8.  History of paroxysmal atrial fibrillation, atrial flutter.

9.  History of complete heart block, symptomatic bradycardia status post

multiple pacemakers.

10.  Cardiac transplant in , complicated by acute renal failure,

requiring dialysis.

11.  Hypertension.

12.  Dyslipidemia.

13.  History of right leg DVT 9 months ago with recurrence now.

14.  Depression.

15.  Anxiety.

16.  History of septal deviation.

17.  History of sinusitis.

18.  History of Rod-Parkinson-White.

19.  Type 2 diabetes mellitus.

 

SOCIAL HISTORY:  The patient is , lives with his wife.  He is employed

as a  but lives a very sedentary lifestyle.  He is a

lifelong nonsmoker.

 

FAMILY HISTORY:  Significant for atrial fibrillation in his father.

 

PHYSICAL EXAMINATION:

VITAL SIGNS:  Temperature is 36.7 degrees Celsius, pulse is 88 and regular,

respiratory rate is 16 and unlabored, blood pressure is 138/94, SpO2 is 96%

on room air.

GENERAL:  The patient is in no acute distress.

HEENT:  Head is atraumatic, normocephalic.  EOMs intact.  Sclerae are

anicteric.  Face is symmetric.  No perioral cyanosis.

NECK:  Without thyromegaly, adenopathy or JVD.  Carotid upstrokes are +2

bilaterally without bruits.

CHEST AND LUNGS:  Clear to auscultation throughout all lung fields.  No

wheezes, rales or rhonchi.

CARDIOVASCULAR:  S1 and S2 are regular with a grade 2/6 basal systolic

murmur, which radiates to the upper chest, also audible at the left sternal

border.  No diastolic murmur is appreciated.  No obvious gallops or rubs. 

PMI is nondisplaced.  No lifts, heaves, or thrills.  Well-healed median

sternotomy wound.

ABDOMEN:  Bowel sounds present.  No masses, organomegaly or tenderness.

EXTREMITIES:  Without clubbing or cyanosis.  Right calf is tense, very warm

to the touch.  No palpable cords.  It is visibly larger than the left leg.

 

LABORATORIES:  White blood cell count is 12.66, hemoglobin 15.4 g/dL,

hematocrit 45.4%, platelet count 251,000.  Sodium 141 mmol/L, potassium 3.9

mmol/L, BUN 30 mg/dL, creatinine 1.70 mg/dL.  Random glucose 174 mg/dL. 

Magnesium level is normal at 1.9 mg/dL.  Homocysteine level is pending. 

Urine is unremarkable.  Mycophenolic acid and metabolite is pending. 

Tacrolimus level is pending.  Hypercoagulability workup is pending.

 

Chest CTA is significant for a small third order filling defect with

pulmonary embolus of right lower lobe, no evidence for mainstem or central

pulmonary embolus.  Slight interstitial prominence throughout both

hemithoraces.  Lower extremity ultrasound shows extensive DVT of the right

leg, extending from the superficial femoral veins, popliteal vein, posterior

tibial, peroneal and anterior tibial veins.

 

ASSESSMENT:

1.  Acute right lower extremity deep venous thrombosis with small pulmonary

embolism.

2.  This appears to be a non-provoked deep venous thrombosis/pulmonary

embolism.

3.  History of transposition of the great vessels, status post surgical

correction as a .

4.  History of aortic valve replacement remotely with redo.

5.  History of endocarditis.

6.  History of atrial dysrhythmias.

7.  History of complete heart block.

8.  History of pacemaker placement.

9.  Cardiac transplant in .

10.  Type 2 diabetes mellitus.

11.  Hypertension.

12.  Dyslipidemia.

 

PLAN:

1.  The patient remains stable from a cardiac standpoint.  He has not

experienced any angina pectoris, anginal equivalent symptoms, signs or

symptoms of heart failure, nor has he had a symptom suggestive of

dysrhythmia.

2.  Continue immunosuppressive medications.  Current levels are pending.

3.  Continue long-term Zocor.

4.  Continue aspirin indefinitely.

5.  Continue Cozaar 100 mg a day.

6.  Continue Lovenox until INR is therapeutic.

7.  The patient will be placed on Coumadin for probable lifetime

anticoagulation.  Goal INR is 2.0-3.0.

 

Thank you for asking us to see this patient in consultation.  There are no

further cardiac recommendations at this time, as he is stable from a cardiac

standpoint.





CARDIOLOGY ATTENDING ADDENDUM (Dr. Contreras):  Agree with above assessment and 
recommendations by JOVANY Harvey

## 2017-03-06 NOTE — DISCHARGE INSTRUCTIONS
Discharge Instructions


Date of Service


Mar 6, 2017.





Admission


Reason for Admission:  Deep Vein Thrombosis, Heart Transplanted, Pulmonar





Discharge


Discharge Diagnosis / Problem:  Acute deep vein thrombosis, small pulmonary 

embolism





Discharge Goals


Goal(s):  Improve disease control, Diagnostic testing, Therapeutic intervention





Activity Recommendations


Activity Limitations:  resume your previous activity





.





Instructions / Follow-Up


Instructions / Follow-Up


You were admitted to the hospital with right leg pain and tightness.  You were 

found to have extensive deep vein thrombosis (DVT) in the right lower extremity 

as well as a small pulmonary embolism in the right lower lobe your lung.  You 

were also found to have acute kidney injury which did improve greatly with IV 

fluids.  Please be sure to keep yourself hydrated with plenty of fluid intake.  

For your DVT, you will be treated with warfarin, which you have taken in the 

past for your previous DVT.  This will be bridged with 5 days of subcutaneous 

Lovenox injections.  You will follow up with the anticoagulation clinic in a 

few days, and you will need regular monitoring of your warfarin to assure you 

are at a therapeutic dose.  You were started on warfarin 5 mg by mouth daily to 

start.





Medications:





*Please take warfarin 5 mg by mouth daily.





*Please inject Lovenox 90 mg subcutaneously every 12 hours for the next 5 days 

while also taking the warfarin as above.   You will be given 100 mg syringes, 

so you will have to waste 0.1 mL before injection to get the correct dose.





*You may continue your other home medications as before.





Medication Instructions:





   * Warfarin is a medicine prescribed to prevent blood clots


   * Warfarin will thin your blood and help prevent new clots


   * Take your medications exactly as directed


   * Never skip a dose.  Never take a double dose.  If you 


      miss a dose, take it as soon as you remember


   * It is important for your doctor to monitor your prothrombin


      time (PT).  This is a lab test


   * Keep your appointment for lab tests





Risk of Adverse Drug Reactions and Interactions:


   


   * Warfarin increases your risk of bleeding


   * The food you eat and other medications you take can affect


      how Warfarin works in your body


   * Ask your doctor about daily aspirin therapy


   * It is very important to talk with your doctor about all of the


      other medicines, antibiotics, vitamins or herbal products that


      you are taking


   * All of your medication must be approved by your doctor, including


      new medicines, as well as medicines you have taken before


      you started taking Warfarin





Diet: 





   * In order for Warfarin to work properly, it is important to keep your


      intake of Vitamin K as consistent as possible


   * You should avoid any sudden change in Vitamin K intake


   * Report any significant changes in your diet or weight to your doctor





Call your Doctor if you experience any of the following:





   * Swelling or Pain in your leg


   * Sudden, continuous pain deep in a muscle


   * Pain that worsens when you are active or when you stand still for a long 

time


   * Chest Pain


   * Sudden Shortness of Breath


   * Rapid or pounding heart beat


   * Fainting


   * Dizziness


   * Cough with blood or bloody sputum


   * Sweating more than normal


   * Bruises


   * heavy or uncontrolled bleeding


   * Blood in your urine, stool or vomit


   * Black or tarry stools





Caring for Your Self at Home:





   * Avoid sitting, standing or lying down for long periods without moving your 

legs


      and feet


   * When traveling by car, stop to get out and move around at least once every 

3 hours


   * On long airplane, train or bus rides, get up and move around when possible


   * If you can't get up, wiggle your toes and tighten your calves to keep your 

blood


      moving








Follow Up: 





   * It is important for you to keep your follow up appointments with your 

medical provider.





Follow-up Anticoagulation Therapy:





You will be scheduled to follow up with the anticoagulation clinic to monitor 

your warfarin and ensure you are therapeutic.





Current Hospital Diet


Patient's current hospital diet: Regular Diet, AHA Diet (Heart Healthy), Low 

Sodium Diet (2gm Na)





Discharge Diet


Recommended Diet:  AHA Diet (Heart Healthy), Low Sodium Diet (2gm Na)





Pending Studies


Studies pending at discharge:  yes


List of pending studies:  


Protein C, protein S, antithrombin III, Factor V Leiden studies,


mycophenolic acid levels, tacrolimus levels, beta-2-GPI immunoglobulin


studies, anti-cardiolipin immunoglobulin studies, prothrombin gene studies





Laboratory Results





 Hemoglobin A1c








Test


  2/11/17


12:21 Range/Units


 


 


Estimated Average Glucose 166   mg/dl


 


Hemoglobin A1c 7.4 H 4.5-5.6  %








 Lipid Panel








Test


  2/11/17


12:21 Range/Units


 


 


Triglycerides Level 134  0-150  mg/dl


 


Cholesterol Level 198  0-200  mg/dl


 


HDL Cholesterol 53   mg/dl


 


Cholesterol/HDL Ratio 3.7   


 


LDL Cholesterol, Calculated 118   mg/dl











Medical Emergencies








.


Who to Call and When:





Medical Emergencies:  If at any time you feel your situation is an emergency, 

please call 911 immediately.





.





Non-Emergent Contact


Non-Emergency issues call your:  Primary Care Provider, Cardiologist


Call Non-Emergent contact if:  you have a fever, your pain is worsening, your 

pain is concerning you, you have any medication questions





.





Past History


Medical & Surgical History:  


(1) Deep vein thrombosis


(2) Pulmonary embolism


.





"Provider Documentation" section prepared by Lizeth Gallagher.





VTE Core Measure


Inpt VTE Proph given/why not?:  Enoxaparin (Lovenox)SQ (treatment dose)

## 2017-03-06 NOTE — DISCHARGE SUMMARY
Discharge Summary


Date of Service


Mar 6, 2017.


 (Lizeth Gallagher .ELMER)





Discharge Summary


Admission Date:


Mar 6, 2017 at 00:02


Discharge Date:  Mar 6, 2017


Discharge Disposition:  Home


Principal Diagnosis:  Acute DVT, small PE


 (Lizeth Gallagher PA-C)





Medication Reconciliation


New Medications:  


Enoxaparin (Enoxaparin Sodium) 100 Mg/Ml Inj


90 MG SQ Q12H for 5 Days, #900 MG


Inject 90 mg subcutaneously every 12 hours for 5 days.


Warfarin Sod (Coumadin) 5 Mg Tab


5 MG PO DAILY@16 for 30 Days, #30 TAB


Take 1 tablet by mouth daily.


 


Continued Medications:  


Alfuzosin Hcl (Uroxatral) 10 Mg Tab


10 MG PO DAILY, 0 Refills





Alprazolam (Xanax) 0.25 Mg Tab


1 TAB PO DAILY PRN for Sleep for 30 Days, #30 TAB





Aspirin (Aspirin Ec) 81 Mg Tab


81 MG PO DAILY





Bupropion (Wellbutrin-Xl) 300 Mg Tabcr


300 MG PO DAILY, TAB





Buspirone Hcl (Buspar) 15 Mg Tab


15 MG PO BID, TAB





Calcium Carbonate-Cholecalcife (Calcium/Vitamin D 500-200 mg-Unit) 1 Tab Tab


1 TAB PO DAILY





Esomeprazole Magnesium (Nexium) 40 Mg Capcr


40 MG PO DAILY, 0 Refills





Losartan Potassium (Cozaar) 100 Mg Tab


100 MG PO DAILY, TAB





Methylphenidate Hcl (Concerta) 54 Mg Tab


54 MG PO QAM, TAB





Multivitamin (Multivitamin)  Tab


1 TAB PO DAILY, TAB





Mycophenolate Mofetil (Mycophenolate Mofetil) 500 Mg Tab


750 MG PO BID





Simvastatin (Zocor) 20 Mg Tab


20 MG PO QPM, 0 Refills





Tacrolimus (Prograf) 1 Mg Cap


1 MG PO BID, 0 Refills











Discharge Exam


Patient reports feeling well.  He complains of a 2 out of 10 dull aching pain 

in his right calf along with swelling.  He denies any new complaints.  He is 

eager for discharge.  Patient remained in sinus rhythm overnight on tele.  The 

patient denies fevers, chills, sweats, chest pain, palpitations, claudication, 

cough, wheezing, shortness of breath, nausea, vomiting, abdominal pain, dysuria

, hematuria, urinary retention, paralysis, weakness, numbness and tingling.


Review of Systems:  


   Constitutional:  No chills, No fever, No sweats


   Eyes:  No diplopia, No eye pain, No worsening of vision


   ENT:  No hearing loss, No sore throat


   Respiratory:  No cough, No shortness of breath, No wheezing


   Cardiovascular:  No chest pain, No claudication, No palpitations


   Abdomen:  No nausea, No pain, No vomiting


   Musculoskeletal:  + calf pain (RLE), + swelling (RLE), No joint pain, No 

muscle pain


   Genitourinary - Male:  No dysuria, No hematuria, No urinary retention


   Neurologic:  No numbness/tingling, No paralysis, No weakness


   Integumentary:  + color change (erythema RLE), No itch, No rash


Physical Exam:  


   General Appearance:  WD/WN, no apparent distress, + obese


   Eyes:  normal inspection, PERRL, EOMI


   ENT:  normal ENT inspection, hearing grossly normal, pharynx normal


   Neck:  supple, no JVD, trachea midline


   Respiratory/Chest:  lungs clear, normal breath sounds, no respiratory 

distress, + decreased breath sounds (bases bilaterally)


   Cardiovascular:  regular rate, rhythm, no gallop, no murmur


   Abdomen / GI:  normal bowel sounds, non tender, soft


   Extremities:  no calf tenderness (not tender to palpation, subjectively 

tender), no pedal edema, + swelling (non-pitting edema RLE), + pertinent finding

 (very mild erythema RLE)


   Neurologic/Psychiatric:  alert, normal mood/affect, oriented x 3


   Skin:  normal color, warm/dry, no rash


 (Lizeth Gallagher ., ELMER)





Hospital Course


50 y/o male with a history of RLE DVT 8-9 months ago treated with Lovenox and 

warfarin, heart transplant 2004, hypertension, and hyperlipidemia presenting 

fever right lower extremity pain and swelling.  Doppler ultrasound of the right 

lower extremity showed extensive DVTs.  CTA showed a small pulmonary embolism 

in the right lower lobe.  Pt was also found to be in acute kidney injury with 

an elevated creatinine above baseline.





Acute RLE DVT and small RLL PE--h/o RLE DVT 8-9 months ago treated with Lovenox 

and warfarin x 6 months.  Just stopped warfarin in December 2016.


-Admit her to telemetry


-Initially received reduced dose of Lovenox 90 mg SC daily due to decreased 

renal function


-Now that renal function has improved, will increase dose to Lovenox 90 mg SC 

q12h.  Will bridge with warfarin x 5 days


-Start warfarin 5 mg PO qd


-Patient will continue to monitor at anticoagulation clinic





Acute kidney injury--improving.  Baseline creatinine 1.1


-Creatinine 2.2 upon arrival


-IVF NSS at 150 cc/hr


-Repeat creatinine 1.7 on 3/6





Heart transplant (2004)


-Continue mycophenolate 750 mg PO BID and tacrolimus 1mg PO BID


-Mycophenolate and tacrolimus levels pending





HTN--stable


-Continue losartan 100 mg PO qd





HLD


-Continue simvastatin 20 mg PO qd





Depression/anxiety


-Continue Wellbutrin 300 mg PO qd and BuSpar 15 mg PO BID. Alprazolam PRN





DVT prophylaxis


-Lovenox and warfarin as above





Code Status


-Level I, FULL RESUSCITATION STATUS





Dispo


-Medically stable for discharge to home.  Will continue Lovenox 90 mg SC q12h x 

5 days, bridging with warfarin 5 mg PO qd


-F/u with anticoagulation clinic 3/9


Total Time Spent:  Greater than 30 minutes


This includes examination of the patient, discharge planning, medication 

reconciliation, and communication with other providers.


 (Lizeth Gallagher ., PA-C)





I agree with PA assessment and plan and have seen and examined 


pt myself


VSS


Labs reviewed


Chest: CTA b/l, no w/r/r


Pt presents with PE/DVT, cont lovenox and coumadin


Discharge to home


F/U with coag clinic and cardiology


 (Bipin Huber D.OAshley)


Discharge Instructions


Please refer to the electronic Patient Visit Report (Discharge Instructions) 

for additional information.


 (Lizeth Gallagher ., PA-C)

## 2017-03-08 LAB
HCG SERPL-ACNC: 4.2 MCG/ML (ref 1–3.5)
MPA GLUCURONIDE: 97.2 MCG/ML (ref 35–100)

## 2017-03-09 LAB
ANTITHROMBINIII ACTIVITY**: 92 % ACTIVITY (ref 80–120)
B2 GLYCOPROTEIN IGA: 25 SAU (ref ?–20)
B2 GLYCOPROTEIN IGG: <9 SGU (ref ?–20)
B2 GLYCOPROTEIN IGM: <9 SMU (ref ?–20)
FK506 TACROLIMUS HIGHLY SENS: 8.9 MCG/L (ref 5–20)
PROT C PPP-ACNC: 146 % (ref 70–180)
PROTEIN S ACT(FUNCT)**1779X: 78 % (ref 70–150)

## 2018-05-03 ENCOUNTER — HOSPITAL ENCOUNTER (OUTPATIENT)
Dept: HOSPITAL 45 - C.RAD1850 | Age: 53
Discharge: HOME | End: 2018-05-03
Attending: INTERNAL MEDICINE
Payer: COMMERCIAL

## 2018-05-03 DIAGNOSIS — M79.642: ICD-10-CM

## 2018-05-03 DIAGNOSIS — M19.041: ICD-10-CM

## 2018-05-03 DIAGNOSIS — M79.641: Primary | ICD-10-CM

## 2018-05-03 DIAGNOSIS — M19.042: ICD-10-CM

## 2018-05-03 NOTE — DIAGNOSTIC IMAGING REPORT
R HAND MIN 3 VIEWS ROUTINE



CLINICAL HISTORY: Bilateral hand pain.    



COMPARISON: None



FINDINGS:  Alignment of the right hand is anatomic. A well-corticated ossific

density along the lateral styloid is chronic. No acute fracture or suspicious

lesion within right hand. There is marked joint space narrowing with extensive

osteophytosis within the DIP joint of the fifth digit and PIP joint of the

second digit. Moderate joint space narrowing and osteophytosis is noted at the

PIP joint of the fourth digit. No definite erosions are identified. Equivocal

erosions of the proximal phalanges of the second and fourth digits are noted.



IMPRESSION: 



1. 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

hand, most severe within the right second digit PIP joint and fifth digit DIP

joint.



2. No definite erosions. Equivocal erosions of the distal aspects of the

proximal phalanges of the second and fourth digits.







Electronically signed by:  Jh Santa M.D.

5/3/2018 2:01 PM



Dictated Date/Time:  5/3/2018 1:59 PM

## 2018-05-03 NOTE — DIAGNOSTIC IMAGING REPORT
L HAND MIN 3 VIEWS ROUTINE



HISTORY:  52 years-old Male M79.641 Bilateral hand painBoth acute bilateral hand

pain without reported trauma



COMPARISON: Right hand radiographs of same day



TECHNIQUE: 3 views of the left hand



FINDINGS: 

Moderate to severe joint space narrowing with marginal spurring and subchondral

sclerosis is noted within the fifth DIP joint along with mild ulnar subluxation

of the distal phalanx. Moderate joint space narrowing with subcortical cystic

changes and marginal spurring involves the third and fourth AP joints. Mild

radiocarpal, triscaphe and first carpometacarpal osteoarthritis. No acute

fracture or dislocation. Soft tissues are unremarkable without opaque foreign

body.



IMPRESSION: 

1. No acute fracture or dislocation.

2. Osteoarthritis as above, most pronounced within the fifth DIP joint. 







The above report was generated using voice recognition software. It may contain

grammatical, syntax or spelling errors.







Electronically signed by:  Zelalem Gross M.D.

5/3/2018 2:02 PM



Dictated Date/Time:  5/3/2018 2:00 PM

## 2021-01-19 DIAGNOSIS — Z23 ENCOUNTER FOR IMMUNIZATION: ICD-10-CM
